# Patient Record
Sex: FEMALE | Race: WHITE | URBAN - METROPOLITAN AREA
[De-identification: names, ages, dates, MRNs, and addresses within clinical notes are randomized per-mention and may not be internally consistent; named-entity substitution may affect disease eponyms.]

---

## 2020-10-20 ENCOUNTER — OFFICE VISIT (OUTPATIENT)
Dept: URBAN - METROPOLITAN AREA CLINIC 27 | Facility: CLINIC | Age: 75
End: 2020-10-20
Payer: MEDICARE

## 2020-10-20 DIAGNOSIS — H25.13 AGE-RELATED NUCLEAR CATARACT, BILATERAL: ICD-10-CM

## 2020-10-20 PROCEDURE — 67028 INJECTION EYE DRUG: CPT | Performed by: OPHTHALMOLOGY

## 2020-10-20 PROCEDURE — 92134 CPTRZ OPH DX IMG PST SGM RTA: CPT | Performed by: OPHTHALMOLOGY

## 2020-10-20 PROCEDURE — 92014 COMPRE OPH EXAM EST PT 1/>: CPT | Performed by: OPHTHALMOLOGY

## 2020-10-20 ASSESSMENT — INTRAOCULAR PRESSURE
OD: 14
OS: 16

## 2020-10-20 NOTE — IMPRESSION/PLAN
Impression: Age-related nuclear cataract, bilateral: H25.13. OU. Plan: Followed by Dr. Rasta Fleming.

## 2020-10-20 NOTE — IMPRESSION/PLAN
Impression: Exudative age-related macular degeneration, bilateral, with active choroidal neovascularization: H35.3231. OU. New-onset SRF OD 10/30/18 
s/p Avastin OU, last OD 06/27/19; OS 05/21/19
s/p Lucentis, last OD 5/7/2020, OS 8/25/2020 Plan: Both eyes are due for full dilated semiannual exam today. Exam and OCT demonstrate RPE irregularity and stable resolution of SRF OD. There is stable, moderate SRF OS s/p Lucentis 8 weeks ago. Recommend repeating anti-VEGF OS only. The diagnosis, natural history, and prognosis of exudative AMD, as well as the R/B/A of laser, PDT, and anti-VEGF were discussed. The patient understands that treatment may not improve vision but should reduce the risk of further visual loss. The patient elects to proceed with intravitreal Lucentis OS, which was performed successfully without complication. 

RTC 8 weeks OCT OU re-eval Lucentis 0.5

## 2020-12-15 ENCOUNTER — OFFICE VISIT (OUTPATIENT)
Dept: URBAN - METROPOLITAN AREA CLINIC 27 | Facility: CLINIC | Age: 75
End: 2020-12-15
Payer: MEDICARE

## 2020-12-15 PROCEDURE — 67028 INJECTION EYE DRUG: CPT | Performed by: OPHTHALMOLOGY

## 2020-12-15 PROCEDURE — 92134 CPTRZ OPH DX IMG PST SGM RTA: CPT | Performed by: OPHTHALMOLOGY

## 2020-12-15 ASSESSMENT — INTRAOCULAR PRESSURE
OD: 13
OS: 16

## 2020-12-15 NOTE — IMPRESSION/PLAN
Impression: Exudative age-related macular degeneration, bilateral, with active choroidal neovascularization: H35.3231. OU. New-onset SRF OD 10/30/18 
s/p Avastin OU, last OD 06/27/19; OS 05/21/19
s/p Lucentis, last OD 5/7/2020, OS 10/20/2020
last DFE OU 10/20/2020 Plan: Exam and OCT demonstrate RPE irregularity and stable resolution of SRF OD. There is persistent, moderate SRF OS s/p Lucentis 8 weeks ago. Recommend repeating anti-VEGF OS only. The diagnosis, natural history, and prognosis of exudative AMD, as well as the R/B/A of laser, PDT, and anti-VEGF were discussed. The patient understands that treatment may not improve vision but should reduce the risk of further visual loss. The patient elects to proceed with intravitreal Lucentis OS, which was performed successfully without complication. 

RTC 8 weeks OCT OU re-eval Lucentis 0.5

## 2021-02-09 ENCOUNTER — OFFICE VISIT (OUTPATIENT)
Dept: URBAN - METROPOLITAN AREA CLINIC 27 | Facility: CLINIC | Age: 76
End: 2021-02-09
Payer: OTHER MISCELLANEOUS

## 2021-02-09 PROCEDURE — 67028 INJECTION EYE DRUG: CPT | Performed by: OPHTHALMOLOGY

## 2021-02-09 PROCEDURE — 92134 CPTRZ OPH DX IMG PST SGM RTA: CPT | Performed by: OPHTHALMOLOGY

## 2021-02-09 ASSESSMENT — INTRAOCULAR PRESSURE
OS: 15
OD: 12

## 2021-02-09 NOTE — IMPRESSION/PLAN
Impression: Age-related nuclear cataract, bilateral: H25.13. OU. Plan: Followed by Dr. Ramona Leary.

## 2021-02-09 NOTE — IMPRESSION/PLAN
Impression: Exudative age-related macular degeneration, bilateral, with active choroidal neovascularization: H35.3231. OU. New-onset SRF OD 10/30/18 
s/p Avastin OU, last OD 06/27/19; OS 05/21/19
s/p Lucentis, last OD 5/7/2020, OS 12/15/2020
last DFE OU 10/20/2020 Plan: Exam and OCT demonstrate RPE irregularity and stable resolution of SRF OD. There is persistent, moderate SRF OS s/p Lucentis 8 weeks ago. Recommend repeating anti-VEGF OS only, continue q8 week injection interval.  The diagnosis, natural history, and prognosis of exudative AMD, as well as the R/B/A of laser, PDT, and anti-VEGF were discussed. The patient understands that treatment may not improve vision but should reduce the risk of further visual loss. The patient elects to proceed with intravitreal Lucentis OS, which was performed successfully without complication. 

RTC 8 weeks OCT OU re-eval Lucentis 0.5, DFE OU (semiannual)

## 2021-04-06 ENCOUNTER — OFFICE VISIT (OUTPATIENT)
Dept: URBAN - METROPOLITAN AREA CLINIC 27 | Facility: CLINIC | Age: 76
End: 2021-04-06
Payer: OTHER MISCELLANEOUS

## 2021-04-06 PROCEDURE — 92134 CPTRZ OPH DX IMG PST SGM RTA: CPT | Performed by: OPHTHALMOLOGY

## 2021-04-06 PROCEDURE — 67028 INJECTION EYE DRUG: CPT | Performed by: OPHTHALMOLOGY

## 2021-04-06 ASSESSMENT — INTRAOCULAR PRESSURE
OS: 16
OD: 17

## 2021-04-06 NOTE — IMPRESSION/PLAN
Impression: Exudative age-related macular degeneration, bilateral, with active choroidal neovascularization: H35.3231. OU. New-onset SRF OD 10/30/18 
s/p Avastin OU, last OD 06/27/19; OS 05/21/19
s/p Lucentis, last OD 5/7/2020, OS 2/9/21
last DFE OU 10/20/2020 Plan: Both eyes are due for full dilated semiannual exam today. Exam and OCT demonstrate RPE irregularity and stable resolution of SRF OD. There is improving SRF OS s/p Lucentis 8 weeks ago. Recommend repeating anti-VEGF OS only, extend to q10 week injection interval.  The diagnosis, natural history, and prognosis of exudative AMD, as well as the R/B/A of laser, PDT, and anti-VEGF were discussed. The patient understands that treatment may not improve vision but should reduce the risk of further visual loss. The patient elects to proceed with intravitreal Lucentis OS, which was performed successfully without complication. 

RTC 10 weeks OCT OU re-eval Lucentis 0.5

## 2021-06-15 ENCOUNTER — OFFICE VISIT (OUTPATIENT)
Dept: URBAN - METROPOLITAN AREA CLINIC 27 | Facility: CLINIC | Age: 76
End: 2021-06-15
Payer: OTHER MISCELLANEOUS

## 2021-06-15 PROCEDURE — 67028 INJECTION EYE DRUG: CPT | Performed by: OPHTHALMOLOGY

## 2021-06-15 PROCEDURE — 92134 CPTRZ OPH DX IMG PST SGM RTA: CPT | Performed by: OPHTHALMOLOGY

## 2021-06-15 ASSESSMENT — INTRAOCULAR PRESSURE
OS: 17
OD: 16

## 2021-06-15 NOTE — IMPRESSION/PLAN
Impression: Age-related nuclear cataract, bilateral: H25.13. OU. Plan: Followed by Dr. Clarisse Peterson.

## 2021-06-15 NOTE — IMPRESSION/PLAN
Impression: Exudative age-related macular degeneration, bilateral, with active choroidal neovascularization: H35.3231. OU. New-onset SRF OD 10/30/18 
s/p Avastin OU, last OD 06/27/19; OS 05/21/19
s/p Lucentis, last OD 5/7/2020, OS 4/6/21
last DFE OU 4/6/21 Plan: Exam and OCT demonstrate RPE irregularity and stable resolution of SRF OD. There is worsening SRF OS s/p Lucentis 10 weeks ago. Recommend repeating anti-VEGF OS only, reduce f/u interval back to 8 weeks. The diagnosis, natural history, and prognosis of exudative AMD, as well as the R/B/A of laser, PDT, and anti-VEGF were discussed. The patient understands that treatment may not improve vision but should reduce the risk of further visual loss. The patient elects to proceed with intravitreal Lucentis OS, which was performed successfully without complication. 

RTC 8 weeks OCT OU re-eval Lucentis 0.5

## 2021-08-10 ENCOUNTER — OFFICE VISIT (OUTPATIENT)
Dept: URBAN - METROPOLITAN AREA CLINIC 27 | Facility: CLINIC | Age: 76
End: 2021-08-10
Payer: OTHER MISCELLANEOUS

## 2021-08-10 DIAGNOSIS — H35.3231 EXUDATIVE AGE-RELATED MACULAR DEGENERATION, BILATERAL, WITH ACTIVE CHOROIDAL NEOVASCULARIZATION: Primary | ICD-10-CM

## 2021-08-10 PROCEDURE — 92134 CPTRZ OPH DX IMG PST SGM RTA: CPT | Performed by: OPHTHALMOLOGY

## 2021-08-10 PROCEDURE — 67028 INJECTION EYE DRUG: CPT | Performed by: OPHTHALMOLOGY

## 2021-08-10 ASSESSMENT — INTRAOCULAR PRESSURE
OD: 18
OS: 20

## 2021-08-10 NOTE — IMPRESSION/PLAN
Impression: Exudative age-related macular degeneration, bilateral, with active choroidal neovascularization: H35.3231. OU. New-onset SRF OD 10/30/18 
s/p Avastin OU, last OD 06/27/19; OS 05/21/19
s/p Lucentis, last OD 5/7/2020, OS 6/15/21
last DFE OU 4/6/21 Plan: Exam and OCT continue to show stable resolution of SRF OD. There is stable SRF OS s/p Lucentis 8 weeks ago. Recommend continuing anti-VEGF OS only, continue q8 week treatment interval.  The diagnosis, natural history, and prognosis of exudative AMD, as well as the R/B/A of laser, PDT, and anti-VEGF were discussed. The patient understands that treatment may not improve vision but should reduce the risk of further visual loss. The patient elects to proceed with intravitreal Lucentis OS, which was performed successfully without complication. 

RTC 8 weeks OCT OU re-eval Lucentis 0.5, DFE OU (semiannual)

## 2021-10-14 ENCOUNTER — OFFICE VISIT (OUTPATIENT)
Dept: URBAN - METROPOLITAN AREA CLINIC 27 | Facility: CLINIC | Age: 76
End: 2021-10-14
Payer: OTHER MISCELLANEOUS

## 2021-10-14 PROCEDURE — 67028 INJECTION EYE DRUG: CPT | Performed by: OPHTHALMOLOGY

## 2021-10-14 PROCEDURE — 92134 CPTRZ OPH DX IMG PST SGM RTA: CPT | Performed by: OPHTHALMOLOGY

## 2021-10-14 PROCEDURE — 99214 OFFICE O/P EST MOD 30 MIN: CPT | Performed by: OPHTHALMOLOGY

## 2021-10-14 ASSESSMENT — INTRAOCULAR PRESSURE
OD: 13
OS: 18

## 2021-10-14 NOTE — IMPRESSION/PLAN
Impression: Age-related nuclear cataract, bilateral: H25.13. OU. Plan: Followed by Dr. Meena Manuel.

## 2021-10-14 NOTE — IMPRESSION/PLAN
Impression: Exudative age-related macular degeneration, bilateral, with active choroidal neovascularization: H35.3231. OU. New-onset SRF OD 10/30/18 
s/p Avastin OU, last OD 06/27/19; OS 05/21/19
s/p Lucentis, last OD 5/7/2020, OS 8/10/21
last DFE OU 4/6/21 Plan: Both eyes are due for full dilated semiannual exam today. Exam and OCT show recurrent SRF OD. There is stable, mild SRF OS s/p Lucentis 8 weeks ago. Recommend continuing anti-VEGF OS and restart anti-VEGF OD, q8 week treatment interval.  The diagnosis, natural history, and prognosis of exudative AMD, as well as the R/B/A of laser, PDT, and anti-VEGF were discussed. The patient understands that treatment may not improve vision but should reduce the risk of further visual loss. The patient elects to proceed with intravitreal Lucentis OD today and OS in one week. Lucentis OD was performed successfully without complication. 

RTC 1 week for Lucentis 0.5 OS (straight)
then 7 weeks OCT OU re-eval Lucentis 0.5

## 2021-10-21 ENCOUNTER — PROCEDURE (OUTPATIENT)
Dept: URBAN - METROPOLITAN AREA CLINIC 27 | Facility: CLINIC | Age: 76
End: 2021-10-21
Payer: OTHER MISCELLANEOUS

## 2021-10-21 PROCEDURE — 67028 INJECTION EYE DRUG: CPT | Performed by: OPHTHALMOLOGY

## 2021-10-21 ASSESSMENT — INTRAOCULAR PRESSURE: OS: 16

## 2021-11-09 ENCOUNTER — OFFICE VISIT (OUTPATIENT)
Dept: URBAN - METROPOLITAN AREA CLINIC 27 | Facility: CLINIC | Age: 76
End: 2021-11-09
Payer: OTHER MISCELLANEOUS

## 2021-11-09 PROCEDURE — 67028 INJECTION EYE DRUG: CPT | Performed by: OPHTHALMOLOGY

## 2021-11-09 PROCEDURE — 92134 CPTRZ OPH DX IMG PST SGM RTA: CPT | Performed by: OPHTHALMOLOGY

## 2021-11-09 PROCEDURE — 92012 INTRM OPH EXAM EST PATIENT: CPT | Performed by: OPHTHALMOLOGY

## 2021-11-09 ASSESSMENT — INTRAOCULAR PRESSURE
OD: 14
OS: 19

## 2021-12-09 ENCOUNTER — OFFICE VISIT (OUTPATIENT)
Dept: URBAN - METROPOLITAN AREA CLINIC 27 | Facility: CLINIC | Age: 76
End: 2021-12-09
Payer: OTHER MISCELLANEOUS

## 2021-12-09 PROCEDURE — 67028 INJECTION EYE DRUG: CPT | Performed by: OPHTHALMOLOGY

## 2021-12-09 PROCEDURE — 92134 CPTRZ OPH DX IMG PST SGM RTA: CPT | Performed by: OPHTHALMOLOGY

## 2021-12-09 NOTE — IMPRESSION/PLAN
Impression: Exudative age-related macular degeneration, bilateral, with active choroidal neovascularization: H35.3231. OU. New-onset SRF OD 10/30/18 
s/p Avastin OU, last OD 11/09/21 ([facility]); OS 05/21/19
s/p Lucentis, last OD 10/14/21, OS 10/21/21
last DFE OU 10/14/21/21 Plan: Exam and OCT show improving SRF OD. There is stable, mild SRF OS s/p Lucentis 8 weeks ago. Recommend continuing anti-VEGF OU, q4 week interval OD and q8 week interval OS. The diagnosis, natural history, and prognosis of exudative AMD, as well as the R/B/A of laser, PDT, and anti-VEGF were discussed. The patient understands that treatment may not improve vision but should reduce the risk of further visual loss. The patient elects to proceed with intravitreal Lucentis OS today and OD in one week. Lucentis OS was performed successfully without complication. 

RTC 1 week for Lucentis 0.5 OD (straight)
then 4 weeks for Lucentis 0.5 OD

## 2021-12-09 NOTE — IMPRESSION/PLAN
Impression: Age-related nuclear cataract, bilateral: H25.13. OU. Plan: Followed by Dr. Gonsalo Crum.

## 2021-12-14 ENCOUNTER — PROCEDURE (OUTPATIENT)
Dept: URBAN - METROPOLITAN AREA CLINIC 27 | Facility: CLINIC | Age: 76
End: 2021-12-14
Payer: OTHER MISCELLANEOUS

## 2021-12-14 PROCEDURE — 67028 INJECTION EYE DRUG: CPT | Performed by: OPHTHALMOLOGY

## 2021-12-14 ASSESSMENT — INTRAOCULAR PRESSURE
OD: 16
OS: 17

## 2022-01-11 ENCOUNTER — PROCEDURE (OUTPATIENT)
Dept: URBAN - METROPOLITAN AREA CLINIC 27 | Facility: CLINIC | Age: 77
End: 2022-01-11
Payer: OTHER MISCELLANEOUS

## 2022-01-11 PROCEDURE — 67028 INJECTION EYE DRUG: CPT | Performed by: OPHTHALMOLOGY

## 2022-01-11 PROCEDURE — 92134 CPTRZ OPH DX IMG PST SGM RTA: CPT | Performed by: OPHTHALMOLOGY

## 2022-01-11 ASSESSMENT — INTRAOCULAR PRESSURE
OS: 12
OD: 12

## 2022-01-11 NOTE — IMPRESSION/PLAN
Impression: Exudative age-related macular degeneration, bilateral, with active choroidal neovascularization: H35.3231. OU. New-onset SRF OD 10/30/18 
s/p Avastin OU, last OD 11/09/21 ([facility]); OS 05/21/19
s/p Lucentis, last OD 12/14/21, OS 10/21/21
last DFE OU 10/14/21 Plan: Exam and OCT show improving SRF OD s/p Lucentis 4 weeks ago. There is stable, mild SRF OS s/p Lucentis 5 weeks ago. Recommend continuing anti-VEGF OU, q4 week interval OD and q8 week interval OS. The diagnosis, natural history, and prognosis of exudative AMD, as well as the R/B/A of laser, PDT, and anti-VEGF were discussed. The patient understands that treatment may not improve vision but should reduce the risk of further visual loss. The patient elects to proceed with intravitreal Lucentis OD today and OS in 4 weeks. Lucentis OD was performed successfully without complication. RTC 4 weeks for Lucentis 0.5 OS (straight) RTC 5 weeks for Lucentis 0.5 OD (straight) RTC 8 weeks for OCT OU re-eval

## 2022-02-08 ENCOUNTER — PROCEDURE (OUTPATIENT)
Dept: URBAN - METROPOLITAN AREA CLINIC 27 | Facility: CLINIC | Age: 77
End: 2022-02-08
Payer: OTHER MISCELLANEOUS

## 2022-02-08 PROCEDURE — 67028 INJECTION EYE DRUG: CPT | Performed by: OPHTHALMOLOGY

## 2022-02-08 ASSESSMENT — INTRAOCULAR PRESSURE
OS: 15
OD: 9

## 2022-02-17 ENCOUNTER — PROCEDURE (OUTPATIENT)
Dept: URBAN - METROPOLITAN AREA CLINIC 27 | Facility: CLINIC | Age: 77
End: 2022-02-17
Payer: OTHER MISCELLANEOUS

## 2022-02-17 PROCEDURE — 67028 INJECTION EYE DRUG: CPT | Performed by: OPHTHALMOLOGY

## 2022-02-17 ASSESSMENT — INTRAOCULAR PRESSURE
OD: 14
OS: 18

## 2022-03-24 ENCOUNTER — OFFICE VISIT (OUTPATIENT)
Dept: URBAN - METROPOLITAN AREA CLINIC 27 | Facility: CLINIC | Age: 77
End: 2022-03-24
Payer: OTHER MISCELLANEOUS

## 2022-03-24 PROCEDURE — 92134 CPTRZ OPH DX IMG PST SGM RTA: CPT | Performed by: OPHTHALMOLOGY

## 2022-03-24 PROCEDURE — 67028 INJECTION EYE DRUG: CPT | Performed by: OPHTHALMOLOGY

## 2022-03-24 PROCEDURE — 92014 COMPRE OPH EXAM EST PT 1/>: CPT | Performed by: OPHTHALMOLOGY

## 2022-03-24 ASSESSMENT — INTRAOCULAR PRESSURE
OD: 18
OS: 17

## 2022-03-24 NOTE — IMPRESSION/PLAN
Impression: Exudative age-related macular degeneration, bilateral, with active choroidal neovascularization: H35.3231. OU. New-onset SRF OD 10/30/18 
s/p Avastin OU, last OD 11/09/21 ([facility]); OS 05/21/19
s/p Lucentis, last OD 2/17/22, OS 2/8/22
last DFE OU 10/14/21 Plan: Both eyes are due for full dilated semiannual exam today. Exam and OCT show trace SRF OD s/p Lucentis 5 weeks ago. There is trace SRF OS s/p Lucentis 6 weeks ago. Recommend continuing anti-VEGF OU, extend treatment intervals as tolerated. The diagnosis, natural history, and prognosis of exudative AMD, as well as the R/B/A of laser, PDT, and anti-VEGF were discussed. The patient understands that treatment may not improve vision but should reduce the risk of further visual loss. The patient elects to proceed with intravitreal Lucentis OD today and OS in 4 weeks. Lucentis OD was performed successfully without complication. RTC 4 weeks for Lucentis 0.5 OS (straight) RTC 5 weeks for Lucentis 0.5 OD (straight) RTC 10 weeks for OCT OU re-eval

## 2022-03-24 NOTE — IMPRESSION/PLAN
Impression: Age-related nuclear cataract, bilateral: H25.13. OU. Plan: Followed by Dr. Mikal Lagunas.

## 2022-04-21 ENCOUNTER — PROCEDURE (OUTPATIENT)
Dept: URBAN - METROPOLITAN AREA CLINIC 27 | Facility: CLINIC | Age: 77
End: 2022-04-21
Payer: OTHER MISCELLANEOUS

## 2022-04-21 DIAGNOSIS — H35.3231 EXUDATIVE AGE-RELATED MACULAR DEGENERATION, BILATERAL, WITH ACTIVE CHOROIDAL NEOVASCULARIZATION: Primary | ICD-10-CM

## 2022-04-21 PROCEDURE — 67028 INJECTION EYE DRUG: CPT | Performed by: OPHTHALMOLOGY

## 2022-04-21 ASSESSMENT — INTRAOCULAR PRESSURE
OS: 17
OD: 16

## 2022-04-28 ENCOUNTER — PROCEDURE (OUTPATIENT)
Dept: URBAN - METROPOLITAN AREA CLINIC 27 | Facility: CLINIC | Age: 77
End: 2022-04-28
Payer: OTHER MISCELLANEOUS

## 2022-04-28 DIAGNOSIS — H35.3231 EXUDATIVE AGE-RELATED MACULAR DEGENERATION, BILATERAL, WITH ACTIVE CHOROIDAL NEOVASCULARIZATION: Primary | ICD-10-CM

## 2022-04-28 PROCEDURE — 67028 INJECTION EYE DRUG: CPT | Performed by: OPHTHALMOLOGY

## 2022-04-28 ASSESSMENT — INTRAOCULAR PRESSURE
OD: 13
OS: 13

## 2022-06-16 ENCOUNTER — OFFICE VISIT (OUTPATIENT)
Dept: URBAN - METROPOLITAN AREA CLINIC 27 | Facility: CLINIC | Age: 77
End: 2022-06-16
Payer: OTHER MISCELLANEOUS

## 2022-06-16 DIAGNOSIS — H25.13 AGE-RELATED NUCLEAR CATARACT, BILATERAL: ICD-10-CM

## 2022-06-16 DIAGNOSIS — H35.3231 EXUDATIVE AGE-RELATED MACULAR DEGENERATION, BILATERAL, WITH ACTIVE CHOROIDAL NEOVASCULARIZATION: Primary | ICD-10-CM

## 2022-06-16 PROCEDURE — 92134 CPTRZ OPH DX IMG PST SGM RTA: CPT | Performed by: OPHTHALMOLOGY

## 2022-06-16 PROCEDURE — 67028 INJECTION EYE DRUG: CPT | Performed by: OPHTHALMOLOGY

## 2022-06-16 ASSESSMENT — INTRAOCULAR PRESSURE
OD: 13
OS: 15

## 2022-06-16 NOTE — IMPRESSION/PLAN
Impression: Age-related nuclear cataract, bilateral: H25.13. OU. Plan: Followed by Dr. Antonio Browning.

## 2022-06-16 NOTE — IMPRESSION/PLAN
Impression: Exudative age-related macular degeneration, bilateral, with active choroidal neovascularization: H35.3231. OU. New-onset SRF OD 10/30/18 
s/p Avastin OU, last OD 11/09/21 ([facility]); OS 05/21/19
s/p Lucentis, last OD 4/28/22, OS 4/21/22
last DFE OU 03/24/2022 Plan: Exam and OCT reveal mild, stable SRF OD s/p Lucentis 7 weeks ago. There is trace SRF OS s/p Lucentis 8 weeks ago. Recommend continuing anti-VEGF OU, maintain q8 week treatment interval for maintenance. The diagnosis, natural history, and prognosis of exudative AMD, as well as the R/B/A of laser, PDT, and anti-VEGF were discussed. The patient understands that treatment may not improve vision but should reduce the risk of further visual loss. The patient elects to proceed with intravitreal Lucentis OS today and OD in 1 week. Lucentis OS was performed successfully without complication. RTC 1 week for Lucentis 0.5 OD (straight) RTC 8 weeks for OCT OU re-eval for Lucentis 0.5

## 2022-06-28 ENCOUNTER — PROCEDURE (OUTPATIENT)
Dept: URBAN - METROPOLITAN AREA CLINIC 27 | Facility: CLINIC | Age: 77
End: 2022-06-28
Payer: OTHER MISCELLANEOUS

## 2022-06-28 DIAGNOSIS — H35.3231 EXUDATIVE AGE-RELATED MACULAR DEGENERATION, BILATERAL, WITH ACTIVE CHOROIDAL NEOVASCULARIZATION: Primary | ICD-10-CM

## 2022-06-28 PROCEDURE — 67028 INJECTION EYE DRUG: CPT | Performed by: OPHTHALMOLOGY

## 2022-06-28 ASSESSMENT — INTRAOCULAR PRESSURE
OD: 14
OS: 14

## 2023-01-26 NOTE — IMPRESSION/PLAN
Impression: Exudative age-related macular degeneration, bilateral, with active choroidal neovascularization: H35.3231. OU.
s/p Avastin OU, last OD 06/27/19; OS 05/21/19
s/p Lucentis, last OD 5/7/2020, OS 8/10/21
last DFE OU 10/21/21 Plan: Pt presents emergently c/o worsening distortion OD x1 day. Exam/OCT show CVNM with SHRM OD; stable, mild SRF OS. Pt is s/p Lucentis OD 10/24/21 and OS on 10/21/21. As pt is <28 days OD, recommend proceeding with Avastin OD today to prevent additional VA loss. 

keep next, OCT OU re-eval Lucentis 0.5 with SM Deep Sutures: 4-0 Vicryl